# Patient Record
Sex: FEMALE | Race: WHITE | NOT HISPANIC OR LATINO | Employment: OTHER | URBAN - METROPOLITAN AREA
[De-identification: names, ages, dates, MRNs, and addresses within clinical notes are randomized per-mention and may not be internally consistent; named-entity substitution may affect disease eponyms.]

---

## 2017-08-30 ENCOUNTER — TRANSFERRED RECORDS (OUTPATIENT)
Dept: HEALTH INFORMATION MANAGEMENT | Facility: CLINIC | Age: 72
End: 2017-08-30

## 2020-01-06 LAB — MAMMOGRAM: NORMAL

## 2020-06-22 ENCOUNTER — TRANSFERRED RECORDS (OUTPATIENT)
Dept: HEALTH INFORMATION MANAGEMENT | Facility: CLINIC | Age: 75
End: 2020-06-22

## 2020-07-07 ENCOUNTER — TRANSCRIBE ORDERS (OUTPATIENT)
Dept: OTHER | Age: 75
End: 2020-07-07

## 2020-07-07 DIAGNOSIS — M40.00 KYPHOSIS (ACQUIRED) (POSTURAL): Primary | ICD-10-CM

## 2020-07-20 ENCOUNTER — DOCUMENTATION ONLY (OUTPATIENT)
Dept: CARE COORDINATION | Facility: CLINIC | Age: 75
End: 2020-07-20

## 2020-08-03 NOTE — TELEPHONE ENCOUNTER
RECORDS RECEIVED FROM: Referral to Dr. Benito at [facility] for Kyphosis and Scoliosis from Kourtney Laureano PA-C at [facility]   DATE RECEIVED: Aug 13, 2020     NOTES STATUS DETAILS   OFFICE NOTE from referring provider Care Everywhere 7/2/20 Georgi   OFFICE NOTE from other specialist N/A    DISCHARGE SUMMARY from hospital N/A    DISCHARGE REPORT from the ER N/A    OPERATIVE REPORT N/A    MEDICATION LIST Internal    IMPLANT RECORD/STICKER N/A    LABS     CBC/DIFF N/A    CULTURES N/A    INJECTIONS DONE IN RADIOLOGY N/A    MRI In process    CT SCAN In process    XRAYS (IMAGES & REPORTS) In process    TUMOR     PATHOLOGY  Slides & report N/A    08/10/20   2:53 PM   TRIA images in PACS  Complete  Peg Anderson CMA    08/03/20   1:15 PM   Called [facility] and asked for all spinal images to be pushed  Peg Anderson CMA

## 2020-08-10 DIAGNOSIS — M40.209 KYPHOSIS: ICD-10-CM

## 2020-08-10 DIAGNOSIS — M41.9 SCOLIOSIS: Primary | ICD-10-CM

## 2020-08-13 ENCOUNTER — ANCILLARY PROCEDURE (OUTPATIENT)
Dept: GENERAL RADIOLOGY | Facility: CLINIC | Age: 75
End: 2020-08-13
Attending: ORTHOPAEDIC SURGERY
Payer: MEDICARE

## 2020-08-13 ENCOUNTER — PRE VISIT (OUTPATIENT)
Dept: ORTHOPEDICS | Facility: CLINIC | Age: 75
End: 2020-08-13

## 2020-08-13 ENCOUNTER — OFFICE VISIT (OUTPATIENT)
Dept: ORTHOPEDICS | Facility: CLINIC | Age: 75
End: 2020-08-13
Payer: COMMERCIAL

## 2020-08-13 VITALS — HEIGHT: 61 IN | BODY MASS INDEX: 21.67 KG/M2 | WEIGHT: 114.8 LBS

## 2020-08-13 DIAGNOSIS — M41.26 OTHER IDIOPATHIC SCOLIOSIS, LUMBAR REGION: ICD-10-CM

## 2020-08-13 RX ORDER — ATORVASTATIN CALCIUM 40 MG/1
40 TABLET, FILM COATED ORAL DAILY
COMMUNITY
Start: 2020-03-05

## 2020-08-13 ASSESSMENT — MIFFLIN-ST. JEOR: SCORE: 965.98

## 2020-08-13 NOTE — LETTER
"    8/13/2020         RE: Raiza Hester  [address]  [place] [address]        Dear Colleague,    Thank you for referring your patient, Raiza Hester, to the [facility]. Please see a copy of my visit note below.    Service Date: 08/13/2020      HISTORY OF PRESENT ILLNESS:  Ms. Hester is a 75-year-old female presenting to [facility] today for evaluation of scoliosis.  The patient has a longstanding history of scoliosis for which she has mainly been followed by her primary care physician.  Due to worsening of her scoliosis and complaints of poor posture, the patient elected to be seen in our clinic today.  The patient denies having any pain or limits to her activity.  Her friends have commented on her poor posture which has made her feel self conscious.  Her back might feel a little tight in the morning, however, after getting up and walking around, she loosens up and has no complaints.  She had a prior diagnosis of osteoporosis, however, was treated and has progressed back to osteopenia.  She also has a history significant for CAD status post 2 stents and is currently on a baby aspirin.  The patient believes she has lost approximately 4 inches in height over the last 5 years.  There are also complaints of early satiety.      PHYSICAL EXAMINATION:    Ht 1.554 m (5' 1.18\")   Wt 52.1 kg (114 lb 12.8 oz)   BMI 21.56 kg/m     LOVE 11.  VAS 0.   The patient is alert and appropriately communicative.  Strength and sensation intact in bilateral lower extremities.  The patient stands with a kyphotic thoracic posture.  She works hard to bring her shoulders back and elevate herself in an erect posture.  She stands with her knees slightly flexed.  Sensation and strength intact in bilateral lower extremities.      IMAGING:  Standing EOS x-ray was obtained prior to today's visit.  Velazquez angle was measured to be 86 degrees throughout her lumbar lordosis.  An x-ray " obtained in  demonstrates a Velazquez angle 66 degrees through the same interval of levels.  A prominent thoracic kyphosis and progression of this is noted on the x-rays as stated previously.          ASSESSMENT:   1.  Progressive lumbar scoliosis.  The patient denies any pain or neurologic symptoms at this time.   2.  Progressive thoracic kyphosis.  This is the patient's primary complaint with regards to her posture.  Again, she is devoid of pain or neurologic sequelae.      PLAN:   1.  At this point in time, the patient is not interested in moving forward with surgical intervention.  She is interested in establishing care today for further followups.  This is very reasonable given her lack of pain and highly functional neurological status.  I would like to see the patient return to clinic in 1 year's time with a repeat x-ray to assess for curve progression.  We had a conversation regarding returning earlier if there are any new symptoms or if she decides she wants to move forward with surgical intervention.  We also discussed that if her curve progression continues to accelerate, we should discuss surgical intervention prior to the Sikhism of neurologic sequelae.  She was understanding of this and agreeable to the plan.           SHARIFA BENITO JR, MD       As dictated by UTE CANTU MD     I saw and evaluated the patient and developed the plan.  Sharifa Benito MD             D: 2020   T: 2020   MT: tatyana      Name:     KHANH LEBLANC   MRN:      [id number]        Account:      [id number]   :      1945           Service Date: 2020      Document: [id number]

## 2020-08-13 NOTE — NURSING NOTE
"Reason For Visit:   Chief Complaint   Patient presents with     Consult     Referral to Dr. Benito at [facility] for Kyphosis and Scoliosis from Kourtney Laureano PA-C at [facility]        Primary MD: No primary care provider on file.  Ref. MD: Kourtney Laureano PA-C at [facility]     ?  No  Occupation retired.    Date of injury: No  Type of injury: no.    Date of surgery: No  Type of surgery: No.    Smoker: No  Request smoking cessation information: No    Ht 1.554 m (5' 1.18\")   Wt 52.1 kg (114 lb 12.8 oz)   BMI 21.56 kg/m      Pain Assessment  Patient Currently in Pain: Denies    Oswestry (LOVE) Questionnaire    OSWESTRY DISABILITY INDEX 8/13/2020   Count 9   Sum 5   Oswestry Score (%) 11.11   Some recent data might be hidden        Visual Analog Pain Scale  Back Pain Scale 0-10: 0  Right leg pain: 0  Left leg pain: 0  Neck Pain Scale 0-10: 0  Right arm pain: 0  Left arm pain: 0    SRS:62 %    Promis 10 Assessment    PROMIS 10 8/13/2020   In general, would you say your health is: Very good   In general, would you say your quality of life is: Excellent   In general, how would you rate your physical health? Very good   In general, how would you rate your mental health, including your mood and your ability to think? Very good   In general, how would you rate your satisfaction with your social activities and relationships? Excellent   In general, please rate how well you carry out your usual social activities and roles Excellent   To what extent are you able to carry out your everyday physical activities such as walking, climbing stairs, carrying groceries, or moving a chair? Completely   How often have you been bothered by emotional problems such as feeling anxious, depressed or irritable? Sometimes   How would you rate your fatigue on average? Mild   How would you rate your pain on average?   0 = No Pain  to  10 = Worst Imaginable Pain 0   In general, would you say your health is: 4   In general, would you say your quality of " life is: 5   In general, how would you rate your physical health? 4   In general, how would you rate your mental health, including your mood and your ability to think? 4   In general, how would you rate your satisfaction with your social activities and relationships? 5   In general, please rate how well you carry out your usual social activities and roles. (This includes activities at home, at work and in your community, and responsibilities as a parent, child, spouse, employee, friend, etc.) 5   To what extent are you able to carry out your everyday physical activities such as walking, climbing stairs, carrying groceries, or moving a chair? 5   In the past 7 days, how often have you been bothered by emotional problems such as feeling anxious, depressed, or irritable? 3   In the past 7 days, how would you rate your fatigue on average? 2   In the past 7 days, how would you rate your pain on average, where 0 means no pain, and 10 means worst imaginable pain? 0   Global Mental Health Score 17   Global Physical Health Score 18   PROMIS TOTAL - SUBSCORES 35   Some recent data might be hidden                Lauryn Meza LPN

## 2020-08-13 NOTE — PROGRESS NOTES
"Service Date: 08/13/2020      HISTORY OF PRESENT ILLNESS:  Ms. Hester is a 75-year-old female presenting to [facility] today for evaluation of scoliosis.  The patient has a longstanding history of scoliosis for which she has mainly been followed by her primary care physician.  Due to worsening of her scoliosis and complaints of poor posture, the patient elected to be seen in our clinic today.  The patient denies having any pain or limits to her activity.  Her friends have commented on her poor posture which has made her feel self conscious.  Her back might feel a little tight in the morning, however, after getting up and walking around, she loosens up and has no complaints.  She had a prior diagnosis of osteoporosis, however, was treated and has progressed back to osteopenia.  She also has a history significant for CAD status post 2 stents and is currently on a baby aspirin.  The patient believes she has lost approximately 4 inches in height over the last 5 years.  There are also complaints of early satiety.      PHYSICAL EXAMINATION:    Ht 1.554 m (5' 1.18\")   Wt 52.1 kg (114 lb 12.8 oz)   BMI 21.56 kg/m     LOVE 11.  VAS 0.   The patient is alert and appropriately communicative.  Strength and sensation intact in bilateral lower extremities.  The patient stands with a kyphotic thoracic posture.  She works hard to bring her shoulders back and elevate herself in an erect posture.  She stands with her knees slightly flexed.  Sensation and strength intact in bilateral lower extremities.      IMAGING:  Standing EOS x-ray was obtained prior to today's visit.  Velazquez angle was measured to be 86 degrees throughout her lumbar lordosis.  An x-ray obtained in 2015 demonstrates a Velazquez angle 66 degrees through the same interval of levels.  A prominent thoracic kyphosis and progression of this is noted on the x-rays as stated previously.          ASSESSMENT:   1.  Progressive lumbar scoliosis.  The patient denies " any pain or neurologic symptoms at this time.   2.  Progressive thoracic kyphosis.  This is the patient's primary complaint with regards to her posture.  Again, she is devoid of pain or neurologic sequelae.      PLAN:   1.  At this point in time, the patient is not interested in moving forward with surgical intervention.  She is interested in establishing care today for further followups.  This is very reasonable given her lack of pain and highly functional neurological status.  I would like to see the patient return to clinic in 1 year's time with a repeat x-ray to assess for curve progression.  We had a conversation regarding returning earlier if there are any new symptoms or if she decides she wants to move forward with surgical intervention.  We also discussed that if her curve progression continues to accelerate, we should discuss surgical intervention prior to the Jew of neurologic sequelae.  She was understanding of this and agreeable to the plan.           SHARIFA BENITO JR, MD       As dictated by UTE CANTU MD     I saw and evaluated the patient and developed the plan.  Sharifa Benito MD             D: 2020   T: 2020   MT: tatyana      Name:     KHANH LEBLANC   MRN:      [id number]        Account:      [id number]   :      1945           Service Date: 2020      Document: [id number]

## 2020-08-14 ENCOUNTER — TELEPHONE (OUTPATIENT)
Dept: ORTHOPEDICS | Facility: CLINIC | Age: 75
End: 2020-08-14

## 2020-08-14 DIAGNOSIS — M40.209 KYPHOSIS: ICD-10-CM

## 2020-08-14 DIAGNOSIS — M41.26 OTHER IDIOPATHIC SCOLIOSIS, LUMBAR REGION: Primary | ICD-10-CM

## 2020-08-14 NOTE — TELEPHONE ENCOUNTER
[facility] Call Center    Phone Message    May a detailed message be left on voicemail: yes     Reason for Call: Other: Pt would like  a  call back as she was briefly told in her appt about light therapy to try out and would like to discuss that     Action Taken: Message routed to:  [facility]o    Travel Screening: Not Applicable

## 2020-08-25 NOTE — TELEPHONE ENCOUNTER
See phone message. See dictation for plan.    I called pt back.  She states is not having any back pain but wants to pursue the Scoliosis specific Therapy discussed at appt.  To try to prevent Progression of her Spinal curve.    I gave her name & ph# of Janae Quiroga PT at [facility]"" & I placed an order & she will call to schedule.  Call back prn.  Pt agreed.   S.O./Radha Guerrero RN.

## 2021-03-21 ENCOUNTER — HEALTH MAINTENANCE LETTER (OUTPATIENT)
Age: 76
End: 2021-03-21

## 2021-04-14 ENCOUNTER — TELEPHONE (OUTPATIENT)
Dept: ORTHOPEDICS | Facility: CLINIC | Age: 76
End: 2021-04-14

## 2021-04-14 NOTE — TELEPHONE ENCOUNTER
[facility] Call Center    Phone Message    May a detailed message be left on voicemail: yes     Reason for Call: Other: Pt would like to know about Dr Benito's opinions or recommendations on the use of inversion tables for Scoliosis. Pt suffers from severe scoliosis but not much pain, just aches and harder to stand straight.     Action Taken: Message routed to:  [facility] ortho    Travel Screening: Not Applicable

## 2021-04-15 NOTE — TELEPHONE ENCOUNTER
See phone message & dictation from last year.  I called pt back & told her that  does feel that Inversion tables are OK to use if they are helping her symptoms & she agreed.  Call back prn. Pt agreed.    S.O./Radha Guerrero RN.

## 2021-09-05 ENCOUNTER — HEALTH MAINTENANCE LETTER (OUTPATIENT)
Age: 76
End: 2021-09-05

## 2022-01-10 DIAGNOSIS — M41.9 SCOLIOSIS: Primary | ICD-10-CM

## 2022-01-13 ENCOUNTER — OFFICE VISIT (OUTPATIENT)
Dept: ORTHOPEDICS | Facility: CLINIC | Age: 77
End: 2022-01-13
Payer: MEDICARE

## 2022-01-13 ENCOUNTER — ANCILLARY PROCEDURE (OUTPATIENT)
Dept: GENERAL RADIOLOGY | Facility: CLINIC | Age: 77
End: 2022-01-13
Attending: ORTHOPAEDIC SURGERY
Payer: MEDICARE

## 2022-01-13 VITALS — BODY MASS INDEX: 20.98 KG/M2 | WEIGHT: 114 LBS | HEIGHT: 62 IN

## 2022-01-13 DIAGNOSIS — M41.26 OTHER IDIOPATHIC SCOLIOSIS, LUMBAR REGION: Primary | ICD-10-CM

## 2022-01-13 DIAGNOSIS — M41.9 SCOLIOSIS: ICD-10-CM

## 2022-01-13 DIAGNOSIS — M40.36 FLATBACK SYNDROME, LUMBAR REGION: ICD-10-CM

## 2022-01-13 DIAGNOSIS — M40.295 OTHER KYPHOSIS OF THORACOLUMBAR REGION: ICD-10-CM

## 2022-01-13 DIAGNOSIS — M41.9 SCOLIOSIS: Primary | ICD-10-CM

## 2022-01-13 PROCEDURE — 99213 OFFICE O/P EST LOW 20 MIN: CPT | Performed by: ORTHOPAEDIC SURGERY

## 2022-01-13 PROCEDURE — 72082 X-RAY EXAM ENTIRE SPI 2/3 VW: CPT | Performed by: STUDENT IN AN ORGANIZED HEALTH CARE EDUCATION/TRAINING PROGRAM

## 2022-01-13 PROCEDURE — 77073 BONE LENGTH STUDIES: CPT | Performed by: STUDENT IN AN ORGANIZED HEALTH CARE EDUCATION/TRAINING PROGRAM

## 2022-01-13 PROCEDURE — 72170 X-RAY EXAM OF PELVIS: CPT | Performed by: RADIOLOGY

## 2022-01-13 ASSESSMENT — MIFFLIN-ST. JEOR: SCORE: 966.1

## 2022-01-13 NOTE — PROGRESS NOTES
HISTORY OF PRESENT ILLNESS:  She returns today for routine followup of her kyphoscoliosis.  Azeb is 76 years old.  She is still quite active.  She walks 4 miles a day over about 2-4 sessions, usually while walking her dog.  She can walk up to 2 miles at a time.  She stands but notes that she has to bend her hips and knees feel better and that she works hard to try to stand up straight.  She is accompanied by her brother today, who encourages her to try to maintain that posture.  She gives herself a visual analog pain scale of a 6 today and Oswestry score of 7 of 18.    PHYSICAL EXAMINATION:  Shows an appropriately developed, enthusiastic, adult female in no acute distress.  She does have a very positive kyphoscoliosis posture along with standing with the hip and knee flexed position.  Evaluation of her gait shows it appears to be normal.    IMAGING:  Radiographic exam performed today includes EOS imaging.  Comparison is made to similar image in 08/2020.  There has been no progression of the scoliosis or kyphosis.        ASSESSMENT:  Severe kyphoscoliosis with flat back syndrome.    PLAN:  As functional as she is, I would encourage her to continue to be active.  She would like to pursue physical therapy with Crystal Malagon, and I think this is a very reasonable thing to do.  She does need to have annual bone mineral density assessment.   Her spine will be invalid because of the kyphoscoliosis, so it will need to be done of her hips and her wrists, and at the point in time where she is only able to stand for 5-10 minutes and walk a block or two, if her bone is such that we could fix it, then it would be appropriate to fix the spine.  I showed her representative radiographs of such a surgical correction today as well.  She and her brother asked extensive questions and had them answered to their apparent satisfaction, and I look forward to seeing them back in a year or so.      SRS score 55.3

## 2022-01-13 NOTE — NURSING NOTE
"Reason For Visit:   Chief Complaint   Patient presents with     RECHECK     follow up scoliosis       Primary MD: Self, F=Referred  Ref. MD: Est    ?  No  Occupation retired.     Date of injury: No  Type of injury: no.     Date of surgery: No  Type of surgery: No.     Smoker: No  Request smoking cessation information: No    Ht 1.568 m (5' 1.73\")   Wt 51.7 kg (114 lb)   BMI 21.03 kg/m      Pain Assessment  Patient Currently in Pain: Yes  0-10 Pain Scale: 6  Primary Pain Location: Back    SRS: 55.3%    Oswestry (LOVE) Questionnaire    OSWESTRY DISABILITY INDEX 1/13/2022   Count 9   Sum 8   Oswestry Score (%) 17.78   Some recent data might be hidden        Visual Analog Pain Scale  Back Pain Scale 0-10: 6  Right leg pain: 0  Left leg pain: 0  Neck Pain Scale 0-10: 0  Right arm pain: 0  Left arm pain: 0    Promis 10 Assessment    PROMIS 10 1/13/2022   In general, would you say your health is: Good   In general, would you say your quality of life is: Excellent   In general, how would you rate your physical health? Very good   In general, how would you rate your mental health, including your mood and your ability to think? Excellent   In general, how would you rate your satisfaction with your social activities and relationships? Excellent   In general, please rate how well you carry out your usual social activities and roles Excellent   To what extent are you able to carry out your everyday physical activities such as walking, climbing stairs, carrying groceries, or moving a chair? Completely   How often have you been bothered by emotional problems such as feeling anxious, depressed or irritable? Never   How would you rate your fatigue on average? Mild   How would you rate your pain on average?   0 = No Pain  to  10 = Worst Imaginable Pain 5   In general, would you say your health is: 3   In general, would you say your quality of life is: 5   In general, how would you rate your physical health? 4   In general, " how would you rate your mental health, including your mood and your ability to think? 5   In general, how would you rate your satisfaction with your social activities and relationships? 5   In general, please rate how well you carry out your usual social activities and roles. (This includes activities at home, at work and in your community, and responsibilities as a parent, child, spouse, employee, friend, etc.) 5   To what extent are you able to carry out your everyday physical activities such as walking, climbing stairs, carrying groceries, or moving a chair? 5   In the past 7 days, how often have you been bothered by emotional problems such as feeling anxious, depressed, or irritable? 1   In the past 7 days, how would you rate your fatigue on average? 2   In the past 7 days, how would you rate your pain on average, where 0 means no pain, and 10 means worst imaginable pain? 5   Global Mental Health Score 20   Global Physical Health Score 16   PROMIS TOTAL - SUBSCORES 36   Some recent data might be hidden                Lauryn Meza LPN

## 2022-01-13 NOTE — LETTER
1/13/2022         RE: Raiza Hester  [address]  [place] [address]        Dear Colleague,    Thank you for referring your patient, Raiza Hester, to the [facility] [place]. Please see a copy of my visit note below.    HISTORY OF PRESENT ILLNESS:  She returns today for routine followup of her kyphoscoliosis.  Azeb is 76 years old.  She is still quite active.  She walks 4 miles a day over about 2-4 sessions, usually while walking her dog.  She can walk up to 2 miles at a time.  She stands but notes that she has to bend her hips and knees feel better and that she works hard to try to stand up straight.  She is accompanied by her brother today, who encourages her to try to maintain that posture.  She gives herself a visual analog pain scale of a 6 today and Oswestry score of 7 of 18.    PHYSICAL EXAMINATION:  Shows an appropriately developed, enthusiastic, adult female in no acute distress.  She does have a very positive kyphoscoliosis posture along with standing with the hip and knee flexed position.  Evaluation of her gait shows it appears to be normal.    IMAGING:  Radiographic exam performed today includes EOS imaging.  Comparison is made to similar image in 08/2020.  There has been no progression of the scoliosis or kyphosis.        ASSESSMENT:  Severe kyphoscoliosis with flat back syndrome.    PLAN:  As functional as she is, I would encourage her to continue to be active.  She would like to pursue physical therapy with Crystal Malagon, and I think this is a very reasonable thing to do.  She does need to have annual bone mineral density assessment.   Her spine will be invalid because of the kyphoscoliosis, so it will need to be done of her hips and her wrists, and at the point in time where she is only able to stand for 5-10 minutes and walk a block or two, if her bone is such that we could fix it, then it would be appropriate to fix the spine.  I showed  her representative radiographs of such a surgical correction today as well.  She and her brother asked extensive questions and had them answered to their apparent satisfaction, and I look forward to seeing them back in a year or so.      SRS score 55.3      Redd Benito MD

## 2022-04-13 ENCOUNTER — TRANSFERRED RECORDS (OUTPATIENT)
Dept: HEALTH INFORMATION MANAGEMENT | Facility: CLINIC | Age: 77
End: 2022-04-13
Payer: COMMERCIAL

## 2022-04-17 ENCOUNTER — HEALTH MAINTENANCE LETTER (OUTPATIENT)
Age: 77
End: 2022-04-17

## 2022-04-22 ENCOUNTER — TRANSFERRED RECORDS (OUTPATIENT)
Dept: HEALTH INFORMATION MANAGEMENT | Facility: CLINIC | Age: 77
End: 2022-04-22
Payer: COMMERCIAL

## 2022-06-15 ENCOUNTER — TRANSFERRED RECORDS (OUTPATIENT)
Dept: ORTHOPEDICS | Facility: CLINIC | Age: 77
End: 2022-06-15

## 2022-07-15 ENCOUNTER — TRANSFERRED RECORDS (OUTPATIENT)
Dept: HEALTH INFORMATION MANAGEMENT | Facility: CLINIC | Age: 77
End: 2022-07-15

## 2022-10-23 ENCOUNTER — HEALTH MAINTENANCE LETTER (OUTPATIENT)
Age: 77
End: 2022-10-23

## 2023-02-07 ENCOUNTER — TELEPHONE (OUTPATIENT)
Dept: ORTHOPEDICS | Facility: CLINIC | Age: 78
End: 2023-02-07
Payer: COMMERCIAL

## 2023-02-07 NOTE — TELEPHONE ENCOUNTER
[facility] Call Center    Phone Message    May a detailed message be left on voicemail: yes     Reason for Call: Other: Patient stated that she bought a Fit Marysol Back Brace/Postur Correct from [employer]. So far so good but she'd like to know what Dr. Benito thinks of it.   She also mentioned that she has been physical therapy for 6 months now with Crystal Malagon and her back is not as bad as it was in 2020.  OK to leave detailed message.    Action Taken: Message routed to:  [facility]opedics    Travel Screening: Not Applicable

## 2023-02-08 NOTE — TELEPHONE ENCOUNTER
See phone message from pt.    I called pt back.  She stated she only wore the new brace she bought once so far so too early to tell if it helps,  but the PT is helping so I advised she continue PT & she agreed.  She wants to know if  is Ok with this type of brace?   I told her I will review with  & call her back if he does not want her to wear that type.    I reviewed that per his last dictation 1-13-22  advised how important yearly Bone health eval. Is for her whole life.  She stated she is seeing Endocrinology at [facility] & had DEXAs done & Diagnosed with Osteoporosis & being treated with Reclast.   Asked her to sign TAO & have DEXAs sent to us for her chart & she agreed.    I told her  will be retiring Dec 2024 so she should call  [phone] in July & make a RTN appt for Jan 2024.  Call back prn.  Pt agreed.  Radha Guerrero RN.

## 2023-06-01 ENCOUNTER — HEALTH MAINTENANCE LETTER (OUTPATIENT)
Age: 78
End: 2023-06-01

## 2023-06-29 ENCOUNTER — TELEPHONE (OUTPATIENT)
Dept: ORTHOPEDICS | Facility: CLINIC | Age: 78
End: 2023-06-29
Payer: COMMERCIAL

## 2023-06-29 DIAGNOSIS — G89.29 CHRONIC BILATERAL LOW BACK PAIN, UNSPECIFIED WHETHER SCIATICA PRESENT: ICD-10-CM

## 2023-06-29 DIAGNOSIS — M40.36 FLATBACK SYNDROME, LUMBAR REGION: ICD-10-CM

## 2023-06-29 DIAGNOSIS — M54.50 CHRONIC BILATERAL LOW BACK PAIN, UNSPECIFIED WHETHER SCIATICA PRESENT: ICD-10-CM

## 2023-06-29 DIAGNOSIS — M40.295 OTHER KYPHOSIS OF THORACOLUMBAR REGION: Primary | ICD-10-CM

## 2023-06-30 RX ORDER — CELECOXIB 100 MG/1
100 CAPSULE ORAL 2 TIMES DAILY PRN
Qty: 60 CAPSULE | Refills: 0 | Status: SHIPPED | OUTPATIENT
Start: 2023-06-30 | End: 2023-07-28

## 2023-06-30 NOTE — TELEPHONE ENCOUNTER
Called patient this morning. She is interested in trying a long acting NSAID. She currently takes advil as needed prior to long walks with her dog. However, back pain is present during walk and then for the rest of the evening and into the night. Pain is improved by morning. She does not have history of stomach ulcers. I will provide prescription for celebrex. Informed her that future refills should come from her PCP.    Yadira Liz PA-C    
[facility] Call Center    Phone Message    May a detailed message be left on voicemail: no     Reason for Call: Other: Requesting c/b from Radha- discuss prescription for celebrex    Action Taken: Message routed to:  [facility]    Travel Screening: Not Applicable                                                                        
no

## 2023-07-28 DIAGNOSIS — G89.29 CHRONIC BILATERAL LOW BACK PAIN, UNSPECIFIED WHETHER SCIATICA PRESENT: ICD-10-CM

## 2023-07-28 DIAGNOSIS — M40.36 FLATBACK SYNDROME, LUMBAR REGION: ICD-10-CM

## 2023-07-28 DIAGNOSIS — M40.295 OTHER KYPHOSIS OF THORACOLUMBAR REGION: ICD-10-CM

## 2023-07-28 DIAGNOSIS — M54.50 CHRONIC BILATERAL LOW BACK PAIN, UNSPECIFIED WHETHER SCIATICA PRESENT: ICD-10-CM

## 2023-07-28 RX ORDER — CELECOXIB 100 MG/1
CAPSULE ORAL
Qty: 28 CAPSULE | Refills: 0 | Status: SHIPPED | OUTPATIENT
Start: 2023-07-28

## 2023-07-28 NOTE — TELEPHONE ENCOUNTER
Long acting NSAID initiated after previous phone call with patient. At that time, discussed that if the medication worked and she wanted future refills, she would have to reach out to her PCP. I will send a small refill and instruct patient to obtain future refills from PCP.     No future refills of celebrex from our team.    Yadira Liz PA-C

## 2023-08-08 DIAGNOSIS — G89.29 CHRONIC BILATERAL LOW BACK PAIN, UNSPECIFIED WHETHER SCIATICA PRESENT: ICD-10-CM

## 2023-08-08 DIAGNOSIS — M40.295 OTHER KYPHOSIS OF THORACOLUMBAR REGION: ICD-10-CM

## 2023-08-08 DIAGNOSIS — M54.50 CHRONIC BILATERAL LOW BACK PAIN, UNSPECIFIED WHETHER SCIATICA PRESENT: ICD-10-CM

## 2023-08-08 DIAGNOSIS — M40.36 FLATBACK SYNDROME, LUMBAR REGION: ICD-10-CM

## 2023-08-08 RX ORDER — CELECOXIB 100 MG/1
CAPSULE ORAL
Qty: 28 CAPSULE | Refills: 0 | OUTPATIENT
Start: 2023-08-08

## 2023-12-27 DIAGNOSIS — M41.9 SCOLIOSIS: Primary | ICD-10-CM

## 2024-01-04 ENCOUNTER — ANCILLARY PROCEDURE (OUTPATIENT)
Dept: GENERAL RADIOLOGY | Facility: CLINIC | Age: 79
End: 2024-01-04
Attending: ORTHOPAEDIC SURGERY
Payer: MEDICARE

## 2024-01-04 ENCOUNTER — OFFICE VISIT (OUTPATIENT)
Dept: ORTHOPEDICS | Facility: CLINIC | Age: 79
End: 2024-01-04
Payer: MEDICARE

## 2024-01-04 VITALS — BODY MASS INDEX: 20.96 KG/M2 | HEIGHT: 61 IN | WEIGHT: 111 LBS

## 2024-01-04 DIAGNOSIS — M41.9 SCOLIOSIS: ICD-10-CM

## 2024-01-04 DIAGNOSIS — M41.26 OTHER IDIOPATHIC SCOLIOSIS, LUMBAR REGION: ICD-10-CM

## 2024-01-04 DIAGNOSIS — M40.295 OTHER KYPHOSIS OF THORACOLUMBAR REGION: Primary | ICD-10-CM

## 2024-01-04 PROCEDURE — 99213 OFFICE O/P EST LOW 20 MIN: CPT | Performed by: PHYSICIAN ASSISTANT

## 2024-01-04 PROCEDURE — 72170 X-RAY EXAM OF PELVIS: CPT | Mod: GC | Performed by: RADIOLOGY

## 2024-01-04 PROCEDURE — 77073 BONE LENGTH STUDIES: CPT | Performed by: STUDENT IN AN ORGANIZED HEALTH CARE EDUCATION/TRAINING PROGRAM

## 2024-01-04 PROCEDURE — 72082 X-RAY EXAM ENTIRE SPI 2/3 VW: CPT | Performed by: STUDENT IN AN ORGANIZED HEALTH CARE EDUCATION/TRAINING PROGRAM

## 2024-01-04 NOTE — PROGRESS NOTES
"REASON FOR VISIT: RECHECK    REFERRING PHYSICIAN: No ref. provider found     PRIMARY CARE PHYSICIAN: Katarzyna Mcgill    HISTORY OF PRESENT ILLNESS: Raiza Hester is a 78 year old female who presents for follow-up on kyphoscoliosis.  She was last seen 2 years ago for recheck.  She follows up again today for repeat x-rays.  She has continued to stay very active over the past 2 years.  She goes on 2 mile walks, walks 8-10,000 steps a day, enjoys swimming, and takes the stairs at her condo.  She does have some moderate back pain, but she controls this with occasional ibuprofen.  She overall feels she functions well, but notes that her posture is \"terrible\".  No other complaints or concerns today.     She has osteoporosis.  This is being managed by [facility].  She has had 2 doses of Reclast and has upcoming bone density scans.     Oswestry score: 24  SRS 60%    PHYSICAL EXAM:   Constitutional - Patient is healthy, well-nourished and appears stated age.   Respiratory - Patient is breathing normally and in no respiratory distress.   Skin - No suspicious rashes or lesions.   Psychiatric - Normal mood and affect.   Cardiovascular - Peripheral pulses are normal.   Eyes - Visual acuity is normal to the written word.   ENT - Hearing intact to the spoken word.   GI - No abdominal distention.   Musculoskeletal - Non-antalgic gait without use of assistive devices. Thoracolumbar kyphosis and scoliotic curvature while standing.     IMAGIN2024 EOS full spine standing AP/lateral view x-rays: Stable appearance of scoliosis curve and thoracolumbar kyphosis.  Pelvic retroversion, flatback syndrome. See full radiologic report in chart.        CLINICAL ASSESSMENT:   1. Severe kyphoscoliosis with flat back syndrome  2. Osteoporosis, on Reclast (2 prior doses)    DISCUSSION/PLAN:   Reviewed today's updated XR images with patient today.  Minimal change in curvatures compared to . She is very functional at this " time with manageable back pain.  Recommend avoiding surgery with current good functional level.  Discussed that the time to pursue surgery would be when her bad days outweighed the good. She does have osteoporosis for which she takes Reclast.  Encouraged her to continue to follow recommendations of [facility] team and get osteoporosis medications to prevent fractures.  Answered her intelligent questions about surgery and functionality.  She would like to continue with physical therapy and we will write an order for this today.  She should follow-up in 1 to 2 years for recheck.  This will be done with one of Dr. Benito's partners.    All questions and concerns were answered to the patient's apparent satisfaction before leaving the clinic.     Thank you for allowing Dr. Benito and I to participate in the care of Raiza LOMBARDO Kacie.    Respectfully,  Yadira Liz PA-C    I saw and evaluated the patient and developed the plan.  She is doing functionally reasonably well.  In doing a risk-benefit analysis at this point I think she will benefit from continued nonoperative management and functioning as she is.  If something changes and she desires to change the plan I would be happy to see her back and have further discussions about this.  She is aware that I will stop operating at the end of this year.  My contact time with this patient was greater than 50% of the encounter.  This included image ordering, image interpretation, face-to-face evaluation, documentation.    Redd Benito MD        CC  Copy to patient    [address]  [place] [address]

## 2024-01-04 NOTE — LETTER
"    2024         RE: Raiza Hester  [address]  [place] [address]        Dear Colleague,    Thank you for referring your patient, Raiza Hester, to the [facility] [place]. Please see a copy of my visit note below.    REASON FOR VISIT: RECHECK    REFERRING PHYSICIAN: No ref. provider found     PRIMARY CARE PHYSICIAN: Katarzyna Mcgill    HISTORY OF PRESENT ILLNESS: Raiza Hester is a 78 year old female who presents for follow-up on kyphoscoliosis.  She was last seen 2 years ago for recheck.  She follows up again today for repeat x-rays.  She has continued to stay very active over the past 2 years.  She goes on 2 mile walks, walks 8-10,000 steps a day, enjoys swimming, and takes the stairs at her condo.  She does have some moderate back pain, but she controls this with occasional ibuprofen.  She overall feels she functions well, but notes that her posture is \"terrible\".  No other complaints or concerns today.     She has osteoporosis.  This is being managed by [facility].  She has had 2 doses of Reclast and has upcoming bone density scans.     Oswestry score: 24  SRS 60%    PHYSICAL EXAM:   Constitutional - Patient is healthy, well-nourished and appears stated age.   Respiratory - Patient is breathing normally and in no respiratory distress.   Skin - No suspicious rashes or lesions.   Psychiatric - Normal mood and affect.   Cardiovascular - Peripheral pulses are normal.   Eyes - Visual acuity is normal to the written word.   ENT - Hearing intact to the spoken word.   GI - No abdominal distention.   Musculoskeletal - Non-antalgic gait without use of assistive devices. Thoracolumbar kyphosis and scoliotic curvature while standing.     IMAGIN2024 EOS full spine standing AP/lateral view x-rays: Stable appearance of scoliosis curve and thoracolumbar kyphosis.  Pelvic retroversion, flatback syndrome. See full radiologic report in " chart.        CLINICAL ASSESSMENT:   1. Severe kyphoscoliosis with flat back syndrome  2. Osteoporosis, on Reclast (2 prior doses)    DISCUSSION/PLAN:   Reviewed today's updated XR images with patient today.  Minimal change in curvatures compared to 2022. She is very functional at this time with manageable back pain.  Recommend avoiding surgery with current good functional level.  Discussed that the time to pursue surgery would be when her bad days outweighed the good. She does have osteoporosis for which she takes Reclast.  Encouraged her to continue to follow recommendations of [facility] team and get osteoporosis medications to prevent fractures.  Answered her intelligent questions about surgery and functionality.  She would like to continue with physical therapy and we will write an order for this today.  She should follow-up in 1 to 2 years for recheck.  This will be done with one of Dr. Benito's partners.    All questions and concerns were answered to the patient's apparent satisfaction before leaving the clinic.     Thank you for allowing Dr. Benito and I to participate in the care of Raiza Hester.    Respectfully,  Yadira Liz PA-C    I saw and evaluated the patient and developed the plan.  She is doing functionally reasonably well.  In doing a risk-benefit analysis at this point I think she will benefit from continued nonoperative management and functioning as she is.  If something changes and she desires to change the plan I would be happy to see her back and have further discussions about this.  She is aware that I will stop operating at the end of this year.  My contact time with this patient was greater than 50% of the encounter.  This included image ordering, image interpretation, face-to-face evaluation, documentation.    Redd Benito MD        CC  Copy to patient    [address]  [place] [address]        Again, thank you for allowing me to participate in the care of  your patient.        Sincerely,        Redd Benito MD

## 2024-01-04 NOTE — NURSING NOTE
"Reason For Visit:   Chief Complaint   Patient presents with    RECHECK     Follow Up: Scoliosis       Primary MD: Katarzyna Mcgill  Ref. MD: EST    ?  No  Occupation retired.     Date of injury: No  Type of injury: no.     Date of surgery: No  Type of surgery: No.     Smoker: No  Request smoking cessation information: No    Ht 1.538 m (5' 0.55\")   Wt 50.3 kg (111 lb)   BMI 21.29 kg/m      Pain Assessment  Patient Currently in Pain: Yes  0-10 Pain Scale: 6  Primary Pain Location: Back    Oswestry (LOVE) Questionnaire        1/4/2024    10:05 AM   OSWESTRY DISABILITY INDEX   Count 5   Sum 6   Oswestry Score (%) 24 %        Visual Analog Pain Scale  Back Pain Scale 0-10: 5.5  Right leg pain: 0  Left leg pain: 0  Neck Pain Scale 0-10: 0  Right arm pain: 0  Left arm pain: 0    Promis 10 Assessment        1/4/2024    10:13 AM   PROMIS 10   In general, would you say your health is: Good   In general, would you say your quality of life is: Very good   In general, how would you rate your physical health? Good   In general, how would you rate your mental health, including your mood and your ability to think? Very good   In general, how would you rate your satisfaction with your social activities and relationships? Very good   In general, please rate how well you carry out your usual social activities and roles Good   To what extent are you able to carry out your everyday physical activities such as walking, climbing stairs, carrying groceries, or moving a chair? Mostly   In the past 7 days, how often have you been bothered by emotional problems such as feeling anxious, depressed, or irritable? Rarely   In the past 7 days, how would you rate your fatigue on average? None   In the past 7 days, how would you rate your pain on average, where 0 means no pain, and 10 means worst imaginable pain? 6   In general, would you say your health is: 3   In general, would you say your quality of life is: 4   In general, how would " you rate your physical health? 3   In general, how would you rate your mental health, including your mood and your ability to think? 4   In general, how would you rate your satisfaction with your social activities and relationships? 4   In general, please rate how well you carry out your usual social activities and roles. (This includes activities at home, at work and in your community, and responsibilities as a parent, child, spouse, employee, friend, etc.) 3   To what extent are you able to carry out your everyday physical activities such as walking, climbing stairs, carrying groceries, or moving a chair? 4   In the past 7 days, how often have you been bothered by emotional problems such as feeling anxious, depressed, or irritable? 2   In the past 7 days, how would you rate your fatigue on average? 1   In the past 7 days, how would you rate your pain on average, where 0 means no pain, and 10 means worst imaginable pain? 6   Global Mental Health Score 16   Global Physical Health Score 15   PROMIS TOTAL - SUBSCORES 31                Lauryn Meza LPN

## 2024-01-16 ENCOUNTER — TRANSFERRED RECORDS (OUTPATIENT)
Dept: ORTHOPEDICS | Facility: CLINIC | Age: 79
End: 2024-01-16
Payer: COMMERCIAL

## 2024-02-12 ENCOUNTER — TRANSFERRED RECORDS (OUTPATIENT)
Dept: HEALTH INFORMATION MANAGEMENT | Facility: CLINIC | Age: 79
End: 2024-02-12
Payer: COMMERCIAL

## 2024-04-22 ENCOUNTER — TRANSFERRED RECORDS (OUTPATIENT)
Dept: HEALTH INFORMATION MANAGEMENT | Facility: CLINIC | Age: 79
End: 2024-04-22
Payer: COMMERCIAL

## 2024-06-02 ENCOUNTER — HEALTH MAINTENANCE LETTER (OUTPATIENT)
Age: 79
End: 2024-06-02

## 2024-06-17 ENCOUNTER — TRANSFERRED RECORDS (OUTPATIENT)
Dept: HEALTH INFORMATION MANAGEMENT | Facility: CLINIC | Age: 79
End: 2024-06-17
Payer: COMMERCIAL

## 2024-08-12 ENCOUNTER — TRANSFERRED RECORDS (OUTPATIENT)
Dept: HEALTH INFORMATION MANAGEMENT | Facility: CLINIC | Age: 79
End: 2024-08-12
Payer: COMMERCIAL

## 2024-09-23 ENCOUNTER — TRANSFERRED RECORDS (OUTPATIENT)
Dept: HEALTH INFORMATION MANAGEMENT | Facility: CLINIC | Age: 79
End: 2024-09-23
Payer: COMMERCIAL

## 2025-02-20 ENCOUNTER — TELEPHONE (OUTPATIENT)
Dept: ORTHOPEDICS | Facility: CLINIC | Age: 80
End: 2025-02-20
Payer: COMMERCIAL

## 2025-02-20 NOTE — TELEPHONE ENCOUNTER
See Phone message from pt.  I let VM that pt can make appt with any of  partners by calling [phone].    Radha Guerrero RN.

## 2025-02-20 NOTE — TELEPHONE ENCOUNTER
Other: Patient is looking to see who a recommended provider would be to be seen for her scoliosis follow up she was wanting to schedule for January of 2026. States that she talks to Dr. Ap Garcia nurse. Would like to have someone on Dr. De Souza team contact her with a provider that can see her for her scoliosis condition. Said a voicemail can be left on her phone with who to see and a contact or callback number     Could we send this information to you in [email or web address] or would you prefer to receive a phone call?:   Patient would prefer a phone call   Okay to leave a detailed message?: Yes at Cell number on file:    Telephone Information:   Mobile [phone]

## 2025-06-15 ENCOUNTER — HEALTH MAINTENANCE LETTER (OUTPATIENT)
Age: 80
End: 2025-06-15

## 2025-08-19 ENCOUNTER — TELEPHONE (OUTPATIENT)
Dept: ORTHOPEDICS | Facility: CLINIC | Age: 80
End: 2025-08-19
Payer: COMMERCIAL